# Patient Record
Sex: MALE | Race: WHITE | Employment: UNEMPLOYED | URBAN - METROPOLITAN AREA
[De-identification: names, ages, dates, MRNs, and addresses within clinical notes are randomized per-mention and may not be internally consistent; named-entity substitution may affect disease eponyms.]

---

## 2017-02-17 ENCOUNTER — OP HISTORICAL/CONVERTED ENCOUNTER (OUTPATIENT)
Dept: OTHER | Age: 42
End: 2017-02-17

## 2018-04-27 ENCOUNTER — APPOINTMENT (OUTPATIENT)
Dept: GENERAL RADIOLOGY | Age: 43
End: 2018-04-27
Attending: EMERGENCY MEDICINE
Payer: MEDICAID

## 2018-04-27 ENCOUNTER — HOSPITAL ENCOUNTER (EMERGENCY)
Age: 43
Discharge: HOME | End: 2018-04-27
Attending: EMERGENCY MEDICINE
Payer: MEDICAID

## 2018-04-27 VITALS
WEIGHT: 250 LBS | HEART RATE: 93 BPM | HEIGHT: 72 IN | OXYGEN SATURATION: 98 % | SYSTOLIC BLOOD PRESSURE: 146 MMHG | TEMPERATURE: 97.8 F | RESPIRATION RATE: 18 BRPM | DIASTOLIC BLOOD PRESSURE: 111 MMHG | BODY MASS INDEX: 33.86 KG/M2

## 2018-04-27 DIAGNOSIS — S90.32XA CONTUSION OF LEFT FOOT, INITIAL ENCOUNTER: Primary | ICD-10-CM

## 2018-04-27 PROCEDURE — 73620 X-RAY EXAM OF FOOT: CPT

## 2018-04-27 PROCEDURE — 99283 EMERGENCY DEPT VISIT LOW MDM: CPT

## 2018-04-27 RX ORDER — NAPROXEN 500 MG/1
500 TABLET ORAL
Qty: 20 TAB | Refills: 0 | Status: SHIPPED | OUTPATIENT
Start: 2018-04-27 | End: 2018-05-31

## 2018-04-27 RX ORDER — LISINOPRIL 5 MG/1
5 TABLET ORAL DAILY
COMMUNITY
End: 2021-02-26

## 2018-04-27 NOTE — DISCHARGE INSTRUCTIONS

## 2018-04-27 NOTE — ED PROVIDER NOTES
EMERGENCY DEPARTMENT HISTORY AND PHYSICAL EXAM      Date: 4/27/2018  Patient Name: Tahmina Zelaya    History of Presenting Illness     Chief Complaint   Patient presents with    Foot Pain     left outer side of foot; x 1 week       History Provided By: Patient    HPI: Tahmina Zelaya, 41 y.o. male with PMHx significant for HTN, presents ambulatory to the ED with c/o continued intermittent L lateral foot pain x 1 week after hitting his foot on his daughter's bed. Pt states his pain is worse with movement and with touch. Additionally, he states he has been unable to wear tennis shoes due to pain. He reports he has been taking Tylenol and Ibuprofen without any lasting relief. Pt notes he has been trying to elevate his foot. He denies any prior evaluation for his pain. Pt denies any prior L foot injuries. He specifically denies any recent fevers, weakness, numbness, or difficulty ambulating. There are no other complaints, changes, or physical findings at this time. PCP: None    Current Outpatient Prescriptions   Medication Sig Dispense Refill    lisinopril (PRINIVIL, ZESTRIL) 5 mg tablet Take 5 mg by mouth daily.  naproxen (NAPROSYN) 500 mg tablet Take 1 Tab by mouth every twelve (12) hours as needed for Pain. 20 Tab 0       Past History     Past Medical History:  Past Medical History:   Diagnosis Date    Asthma     Hypertension        Past Surgical History:  Past Surgical History:   Procedure Laterality Date    HX ORTHOPAEDIC Right        Family History:  History reviewed. No pertinent family history. Social History:  Social History   Substance Use Topics    Smoking status: Former Smoker    Smokeless tobacco: Never Used    Alcohol use No       Allergies: Allergies   Allergen Reactions    Tramadol Hives         Review of Systems   Review of Systems   Constitutional: Negative. Negative for chills, diaphoresis and fever. HENT: Negative.   Negative for congestion, ear pain, sore throat and trouble swallowing. Eyes: Negative. Negative for photophobia, pain, redness and visual disturbance. Respiratory: Negative. Negative for cough, chest tightness, shortness of breath and wheezing. Cardiovascular: Negative. Negative for chest pain and palpitations. Gastrointestinal: Negative. Negative for abdominal pain, blood in stool, diarrhea, nausea and vomiting. Genitourinary: Negative for dysuria and frequency. Musculoskeletal: Positive for myalgias (L foot). Negative for back pain, gait problem, joint swelling and neck pain. Skin: Negative. Neurological: Negative. Negative for seizures, syncope and headaches. Psychiatric/Behavioral: Negative. Negative for behavioral problems and confusion. The patient is not nervous/anxious. All other systems reviewed and are negative. Physical Exam   Physical Exam   Constitutional: He appears well-developed. No distress. HENT:   Head: Normocephalic and atraumatic. Eyes: EOM are normal.   Neck: Normal range of motion. Cardiovascular:   Well perfused   Pulmonary/Chest: Effort normal. No respiratory distress. Musculoskeletal:   Tenderness over anterior L 5th metatarsal PIP and DIP joint. Able to flex and extend at ankle and metatarsals. 2+ DP pulses. Good capillary refill. Neurological: He is alert. Psychiatric: He has a normal mood and affect. Nursing note and vitals reviewed. Diagnostic Study Results     Labs -   No results found for this or any previous visit (from the past 12 hour(s)). Radiologic Studies -   XR FOOT LT AP/LAT   Final Result   EXAM:  XR FOOT LT AP/LAT     INDICATION:   Trauma.     COMPARISON:  None.     FINDINGS:  Two views of the left foot demonstrate no fracture or other acute  osseous or articular abnormality. The soft tissues are within normal limits.     IMPRESSION  IMPRESSION:  No acute abnormality. Medical Decision Making   I am the first provider for this patient.     I reviewed the vital signs, available nursing notes, past medical history, past surgical history, family history and social history. Vital Signs-Reviewed the patient's vital signs. Patient Vitals for the past 12 hrs:   Temp Pulse Resp BP SpO2   04/27/18 0345 - - - - 98 %   04/27/18 0337 97.8 °F (36.6 °C) 93 18 (!) 146/111 98 %     Records Reviewed: Nursing Notes and Old Medical Records    Provider Notes (Medical Decision Making):   Patient presents with left foot pain. pain. Will get imaging to further evaluate for fracture vs. Dislocation vs. Contusion. ED Course:   Initial assessment performed. The patients presenting problems have been discussed, and they are in agreement with the care plan formulated and outlined with them. I have encouraged them to ask questions as they arise throughout their visit. Discharge Note:  4:15 AM  The patient has been re-evaluated and is ready for discharge. Reviewed available results with patient. Counseled patient on diagnosis and care plan. Patient has expressed understanding, and all questions have been answered. Patient agrees with plan and agrees to follow up as recommended, or to return to the ED if their symptoms worsen. Discharge instructions have been provided and explained to the patient, along with reasons to return to the ED. PLAN:  1. Discharge Medication List as of 4/27/2018  4:30 AM      START taking these medications    Details   naproxen (NAPROSYN) 500 mg tablet Take 1 Tab by mouth every twelve (12) hours as needed for Pain., Print, Disp-20 Tab, R-0         CONTINUE these medications which have NOT CHANGED    Details   lisinopril (PRINIVIL, ZESTRIL) 5 mg tablet Take 5 mg by mouth daily. , Historical Med           2. Follow-up Information     Follow up With Details Comments Contact Info    [facility] [place]  If symptoms worsen [address]  [phone]        Return to ED if worse     Diagnosis     Clinical Impression:   1.  Contusion of left foot, initial encounter        Attestations: This note is prepared by Hailey Hidalgo, acting as Scribe for Neisha Mann M.D. The scribe's documentation has been prepared under my direction and personally reviewed by me in its entirety. I confirm that the note above accurately reflects all work, treatment, procedures, and medical decision making performed by me.   Neisha Mann M.D

## 2018-04-27 NOTE — ED NOTES
Pt arrived to ED via ambulatory with c/o left lateral foot pain. Pt. States was running and hit daughters bed x 1 week ago. Pt is alert and orientated X 4; skin is intact; lungs are clear; pt breathes well on room air; Pt is in no acute distress. Will continue to monitor. See nursing assessment. Safety precautions in place; call light within reach. Emergency Department Nursing Plan of Care       The Nursing Plan of Care is developed from the Nursing assessment and Emergency Department Attending provider initial evaluation. The plan of care may be reviewed in the ED Provider note.     The Plan of Care was developed with the following considerations:   Patient / Family readiness to learn indicated by:verbalized understanding  Persons(s) to be included in education: patient  Barriers to Learning/Limitations:No    Signed     Samantha Gutierrez RN    4/27/2018   3:37 AM

## 2018-04-27 NOTE — ED NOTES
Assumed patient care for task only. Patient given copy of dc instructions and 1 paper script(s) and 0 electronic scripts. Patient verbalized understanding of instructions and script (s). Patient given a current medication reconciliation form and verbalized understanding of their medications. Patient verbalized understanding of the importance of discussing medications with  his or her physician or clinic they will be following up with. Patient alert and oriented and in no acute distress. Patient offered wheelchair from treatment area to hospital entrance, patient declined wheelchair.

## 2018-05-31 ENCOUNTER — HOSPITAL ENCOUNTER (EMERGENCY)
Age: 43
Discharge: HOME | End: 2018-05-31
Attending: EMERGENCY MEDICINE
Payer: MEDICAID

## 2018-05-31 VITALS
BODY MASS INDEX: 33.05 KG/M2 | SYSTOLIC BLOOD PRESSURE: 117 MMHG | RESPIRATION RATE: 18 BRPM | WEIGHT: 244 LBS | OXYGEN SATURATION: 96 % | HEART RATE: 101 BPM | DIASTOLIC BLOOD PRESSURE: 80 MMHG | TEMPERATURE: 98 F | HEIGHT: 72 IN

## 2018-05-31 DIAGNOSIS — B34.9 VIRAL SYNDROME: ICD-10-CM

## 2018-05-31 DIAGNOSIS — J00 NASOPHARYNGITIS: Primary | ICD-10-CM

## 2018-05-31 DIAGNOSIS — R05.9 COUGH: ICD-10-CM

## 2018-05-31 PROCEDURE — 99281 EMR DPT VST MAYX REQ PHY/QHP: CPT

## 2018-05-31 RX ORDER — BENZONATATE 100 MG/1
100 CAPSULE ORAL
Qty: 30 CAP | Refills: 0 | Status: SHIPPED | OUTPATIENT
Start: 2018-05-31 | End: 2018-06-07

## 2018-05-31 RX ORDER — CODEINE PHOSPHATE AND GUAIFENESIN 10; 100 MG/5ML; MG/5ML
5 SOLUTION ORAL
Qty: 120 ML | Refills: 0 | Status: SHIPPED | OUTPATIENT
Start: 2018-05-31 | End: 2018-05-31

## 2018-05-31 RX ORDER — ALBUTEROL SULFATE 90 UG/1
1-2 AEROSOL, METERED RESPIRATORY (INHALATION)
Qty: 1 INHALER | Refills: 1 | Status: SHIPPED | OUTPATIENT
Start: 2018-05-31

## 2018-05-31 RX ORDER — CODEINE PHOSPHATE AND GUAIFENESIN 10; 100 MG/5ML; MG/5ML
5 SOLUTION ORAL
Qty: 120 ML | Refills: 0 | Status: SHIPPED | OUTPATIENT
Start: 2018-05-31 | End: 2018-07-24

## 2018-05-31 RX ORDER — METHYLPREDNISOLONE 4 MG/1
TABLET ORAL
Qty: 1 DOSE PACK | Refills: 0 | Status: SHIPPED | OUTPATIENT
Start: 2018-05-31 | End: 2018-07-24

## 2018-05-31 NOTE — ED PROVIDER NOTES
EMERGENCY DEPARTMENT HISTORY AND PHYSICAL EXAM    Date: 5/31/2018  Patient Name: Jimmy Aden    History of Presenting Illness     Chief Complaint   Patient presents with    Cough     4-5 days         History Provided By: Patient        HPI: Jimmy Aden is a 41 y.o. male with a PMH of hypertension and asthma (seasonal) who presents with cough, congestion for 5 days. Pt states he was taking otc cough medication and felt better but his cough was still persistent. Pt's wife has similar symptoms at home. Pt denies any fever, nausea, vomiting, or chest pain. States he feels like he is wheezing at night and uses his daughters nebulizer which relives his symptoms. Pt is diagnosed with asthma but does not have an inhaler. PCP: None    Current Outpatient Prescriptions   Medication Sig Dispense Refill    methylPREDNISolone (MEDROL, JEOVANY,) 4 mg tablet Take as directed 1 Dose Pack 0    benzonatate (TESSALON PERLES) 100 mg capsule Take 1 Cap by mouth three (3) times daily as needed for Cough for up to 7 days. 30 Cap 0    albuterol (PROVENTIL HFA, VENTOLIN HFA, PROAIR HFA) 90 mcg/actuation inhaler Take 1-2 Puffs by inhalation every four (4) hours as needed for Wheezing. 1 Inhaler 1    guaiFENesin-codeine (ROBITUSSIN AC) 100-10 mg/5 mL solution Take 5 mL by mouth nightly. Max Daily Amount: 5 mL. 120 mL 0    lisinopril (PRINIVIL, ZESTRIL) 5 mg tablet Take 5 mg by mouth daily. Past History     Past Medical History:  Past Medical History:   Diagnosis Date    Asthma     Hypertension        Past Surgical History:  Past Surgical History:   Procedure Laterality Date    HX ORTHOPAEDIC Right        Family History:  History reviewed. No pertinent family history. Social History:  Social History   Substance Use Topics    Smoking status: Former Smoker    Smokeless tobacco: Never Used    Alcohol use No       Allergies:   Allergies   Allergen Reactions    Tomato Swelling    Tramadol Hives         Review of Systems   Review of Systems   Constitutional: Positive for fatigue and fever. Negative for activity change, appetite change and chills. HENT: Positive for congestion. Negative for ear discharge, ear pain, facial swelling and mouth sores. Eyes: Negative for discharge. Respiratory: Positive for cough and wheezing. Negative for choking, chest tightness, shortness of breath and stridor. Neurological: Positive for headaches. Negative for light-headedness. All other systems reviewed and are negative. Physical Exam     Vitals:    05/31/18 1141   BP: 117/80   Pulse: (!) 101   Resp: 18   Temp: 98 °F (36.7 °C)   SpO2: 96%   Weight: 110.7 kg (244 lb)   Height: 6' (1.829 m)     Physical Exam   Constitutional: He appears well-developed. HENT:   Head: Head is without raccoon's eyes, without Green's sign and without contusion. Right Ear: No tenderness. Tympanic membrane is not bulging. No middle ear effusion (clear effusion). Left Ear: No tenderness. Tympanic membrane is not bulging. No middle ear effusion (clear effusion). Nose: Mucosal edema and rhinorrhea present. No sinus tenderness or nasal deformity. Mouth/Throat: No dental abscesses or dental caries. Posterior oropharyngeal erythema present. No oropharyngeal exudate, posterior oropharyngeal edema or tonsillar abscesses. Eyes: Right conjunctiva is injected. Left conjunctiva is injected. Cardiovascular: Regular rhythm. Pulmonary/Chest: Breath sounds normal. He has no decreased breath sounds. He has no wheezes. He has no rhonchi. He has no rales. Lymphadenopathy:        Head (right side): No tonsillar adenopathy present. Head (left side): No tonsillar adenopathy present. Skin: Skin is warm. Diagnostic Study Results     Labs -   No results found for this or any previous visit (from the past 12 hour(s)).     Radiologic Studies -   No orders to display     CT Results  (Last 48 hours)    None        CXR Results  (Last 48 hours) None            Medical Decision Making   I am the first provider for this patient. I reviewed the vital signs, available nursing notes, past medical history, past surgical history, family history and social history. Vital Signs-Reviewed the patient's vital signs. Disposition:  discharged    DISCHARGE NOTE:   12:35      Care plan outlined and precautions discussed. Patient has no new complaints, changes, or physical findings. All medications were reviewed with the patient; will d/c home with Medrol Dose Pack, Robitussin AC, Albuterol and Tessalon Pearls. All of pt's questions and concerns were addressed. Patient was instructed and agrees to follow up with [facility], as well as to return to the ED upon further deterioration. Patient is ready to go home. Follow-up Information     Follow up With Details Comments Contact Info    [facility] In 1 week If symptoms worsen [address]  [phone]          Discharge Medication List as of 5/31/2018 12:29 PM      START taking these medications    Details   methylPREDNISolone (MEDROL, JEOVANY,) 4 mg tablet Take as directed, Normal, Disp-1 Dose Pack, R-0      benzonatate (TESSALON PERLES) 100 mg capsule Take 1 Cap by mouth three (3) times daily as needed for Cough for up to 7 days. , Normal, Disp-30 Cap, R-0      albuterol (PROVENTIL HFA, VENTOLIN HFA, PROAIR HFA) 90 mcg/actuation inhaler Take 1-2 Puffs by inhalation every four (4) hours as needed for Wheezing., Normal, Disp-1 Inhaler, R-1      guaiFENesin-codeine (ROBITUSSIN AC) 100-10 mg/5 mL solution Take 5 mL by mouth nightly. Max Daily Amount: 5 mL. , Print, Disp-120 mL, R-0         CONTINUE these medications which have NOT CHANGED    Details   lisinopril (PRINIVIL, ZESTRIL) 5 mg tablet Take 5 mg by mouth daily. , Historical Med             Provider Notes (Medical Decision Making):   URI, Bronchitis, Asthma Exacerbation    Procedures        Diagnosis     Clinical Impression:   1. Nasopharyngitis    2. Cough    3.  Viral syndrome

## 2018-05-31 NOTE — ED NOTES
Patient here with c/o congested cough. Patient states problem with cough for 4-5 days. States he thought it was getting better but then didn't. Patient reports that his wife been coughing too. Patient denies fevers. Emergency Department Nursing Plan of Care       The Nursing Plan of Care is developed from the Nursing assessment and Emergency Department Attending provider initial evaluation. The plan of care may be reviewed in the ED Provider note.     The Plan of Care was developed with the following considerations:   Patient / Family readiness to learn indicated by:verbalized understanding  Persons(s) to be included in education: patient  Barriers to Learning/Limitations:No    Signed     Quinton Sanchez RN    5/31/2018   12:36 PM

## 2018-05-31 NOTE — DISCHARGE INSTRUCTIONS
Cough: Care Instructions  Your Care Instructions    A cough is your body's response to something that bothers your throat or airways. Many things can cause a cough. You might cough because of a cold or the flu, bronchitis, or asthma. Smoking, postnasal drip, allergies, and stomach acid that backs up into your throat also can cause coughs. A cough is a symptom, not a disease. Most coughs stop when the cause, such as a cold, goes away. You can take a few steps at home to cough less and feel better. Follow-up care is a key part of your treatment and safety. Be sure to make and go to all appointments, and call your doctor if you are having problems. It's also a good idea to know your test results and keep a list of the medicines you take. How can you care for yourself at home? · Drink lots of water and other fluids. This helps thin the mucus and soothes a dry or sore throat. Honey or lemon juice in hot water or tea may ease a dry cough. · Take cough medicine as directed by your doctor. · Prop up your head on pillows to help you breathe and ease a dry cough. · Try cough drops to soothe a dry or sore throat. Cough drops don't stop a cough. Medicine-flavored cough drops are no better than candy-flavored drops or hard candy. · Do not smoke. Avoid secondhand smoke. If you need help quitting, talk to your doctor about stop-smoking programs and medicines. These can increase your chances of quitting for good. When should you call for help? Call 911 anytime you think you may need emergency care. For example, call if:  ? · You have severe trouble breathing. ?Call your doctor now or seek immediate medical care if:  ? · You cough up blood. ? · You have new or worse trouble breathing. ? · You have a new or higher fever. ? · You have a new rash. ? Watch closely for changes in your health, and be sure to contact your doctor if:  ? · You cough more deeply or more often, especially if you notice more mucus or a change in the color of your mucus. ? · You have new symptoms, such as a sore throat, an earache, or sinus pain. ? · You do not get better as expected. Where can you learn more? Go to http://samir-selvin.info/. Enter D279 in the search box to learn more about \"Cough: Care Instructions. \"  Current as of: May 12, 2017  Content Version: 11.4  © 2006-2017 [employer]. Care instructions adapted under license by [employer] (which disclaims liability or warranty for this information). If you have questions about a medical condition or this instruction, always ask your healthcare professional. [place] [address] any warranty or liability for your use of this information. Upper Respiratory Infection (Cold): Care Instructions  Your Care Instructions    An upper respiratory infection, or URI, is an infection of the nose, sinuses, or throat. URIs are spread by coughs, sneezes, and direct contact. The common cold is the most frequent kind of URI. The flu and sinus infections are other kinds of URIs. Almost all URIs are caused by viruses. Antibiotics won't cure them. But you can treat most infections with home care. This may include drinking lots of fluids and taking over-the-counter pain medicine. You will probably feel better in 4 to 10 days. The doctor has checked you carefully, but problems can develop later. If you notice any problems or new symptoms, get medical treatment right away. Follow-up care is a key part of your treatment and safety. Be sure to make and go to all appointments, and call your doctor if you are having problems. It's also a good idea to know your test results and keep a list of the medicines you take. How can you care for yourself at home? · To prevent dehydration, drink plenty of fluids, enough so that your urine is light yellow or clear like water.  Choose water and other caffeine-free clear liquids until you feel better. If you have kidney, heart, or liver disease and have to limit fluids, talk with your doctor before you increase the amount of fluids you drink. · Take an over-the-counter pain medicine, such as acetaminophen (Tylenol), ibuprofen (Advil, Motrin), or naproxen (Aleve). Read and follow all instructions on the label. · Before you use cough and cold medicines, check the label. These medicines may not be safe for young children or for people with certain health problems. · Be careful when taking over-the-counter cold or flu medicines and Tylenol at the same time. Many of these medicines have acetaminophen, which is Tylenol. Read the labels to make sure that you are not taking more than the recommended dose. Too much acetaminophen (Tylenol) can be harmful. · Get plenty of rest.  · Do not smoke or allow others to smoke around you. If you need help quitting, talk to your doctor about stop-smoking programs and medicines. These can increase your chances of quitting for good. When should you call for help? Call 911 anytime you think you may need emergency care. For example, call if:  ? · You have severe trouble breathing. ?Call your doctor now or seek immediate medical care if:  ? · You seem to be getting much sicker. ? · You have new or worse trouble breathing. ? · You have a new or higher fever. ? · You have a new rash. ? Watch closely for changes in your health, and be sure to contact your doctor if:  ? · You have a new symptom, such as a sore throat, an earache, or sinus pain. ? · You cough more deeply or more often, especially if you notice more mucus or a change in the color of your mucus. ? · You do not get better as expected. Where can you learn more? Go to http://samir-selvin.info/. Enter W107 in the search box to learn more about \"Upper Respiratory Infection (Cold): Care Instructions. \"  Current as of:  May 12, 2017  Content Version: 11.4  © 3234-0026 [employer]. Care instructions adapted under license by [employer] (which disclaims liability or warranty for this information). If you have questions about a medical condition or this instruction, always ask your healthcare professional. [address] any warranty or liability for your use of this information.

## 2018-07-24 ENCOUNTER — APPOINTMENT (OUTPATIENT)
Dept: GENERAL RADIOLOGY | Age: 43
End: 2018-07-24
Payer: MEDICAID

## 2018-07-24 ENCOUNTER — HOSPITAL ENCOUNTER (EMERGENCY)
Age: 43
Discharge: HOME | End: 2018-07-24
Attending: EMERGENCY MEDICINE
Payer: MEDICAID

## 2018-07-24 VITALS
HEIGHT: 72 IN | BODY MASS INDEX: 33.86 KG/M2 | DIASTOLIC BLOOD PRESSURE: 98 MMHG | RESPIRATION RATE: 18 BRPM | HEART RATE: 110 BPM | OXYGEN SATURATION: 99 % | WEIGHT: 250 LBS | SYSTOLIC BLOOD PRESSURE: 157 MMHG | TEMPERATURE: 98.6 F

## 2018-07-24 DIAGNOSIS — J06.9 ACUTE UPPER RESPIRATORY INFECTION: Primary | ICD-10-CM

## 2018-07-24 DIAGNOSIS — R06.2 WHEEZING: ICD-10-CM

## 2018-07-24 PROCEDURE — 77030029684 HC NEB SM VOL KT MONA -A

## 2018-07-24 PROCEDURE — 94640 AIRWAY INHALATION TREATMENT: CPT

## 2018-07-24 PROCEDURE — 74011000250 HC RX REV CODE- 250: Performed by: PHYSICIAN ASSISTANT

## 2018-07-24 PROCEDURE — 71046 X-RAY EXAM CHEST 2 VIEWS: CPT

## 2018-07-24 PROCEDURE — 99282 EMERGENCY DEPT VISIT SF MDM: CPT

## 2018-07-24 RX ORDER — CODEINE PHOSPHATE AND GUAIFENESIN 10; 100 MG/5ML; MG/5ML
5 SOLUTION ORAL
Qty: 115 ML | Refills: 0 | Status: SHIPPED | OUTPATIENT
Start: 2018-07-24 | End: 2021-02-26

## 2018-07-24 RX ORDER — PREDNISONE 20 MG/1
20 TABLET ORAL DAILY
Qty: 10 TAB | Refills: 0 | Status: SHIPPED | OUTPATIENT
Start: 2018-07-24 | End: 2018-08-03

## 2018-07-24 RX ORDER — AZITHROMYCIN 250 MG/1
TABLET, FILM COATED ORAL
Qty: 6 TAB | Refills: 0 | Status: SHIPPED | OUTPATIENT
Start: 2018-07-24 | End: 2018-07-29

## 2018-07-24 RX ORDER — ALBUTEROL SULFATE 0.83 MG/ML
5 SOLUTION RESPIRATORY (INHALATION)
Status: COMPLETED | OUTPATIENT
Start: 2018-07-24 | End: 2018-07-24

## 2018-07-24 RX ORDER — ALBUTEROL SULFATE 90 UG/1
2 AEROSOL, METERED RESPIRATORY (INHALATION)
Qty: 1 INHALER | Refills: 0 | Status: SHIPPED | OUTPATIENT
Start: 2018-07-24

## 2018-07-24 RX ORDER — IPRATROPIUM BROMIDE AND ALBUTEROL SULFATE 2.5; .5 MG/3ML; MG/3ML
3 SOLUTION RESPIRATORY (INHALATION)
Status: COMPLETED | OUTPATIENT
Start: 2018-07-24 | End: 2018-07-24

## 2018-07-24 RX ADMIN — IPRATROPIUM BROMIDE AND ALBUTEROL SULFATE 3 ML: .5; 3 SOLUTION RESPIRATORY (INHALATION) at 20:43

## 2018-07-24 RX ADMIN — ALBUTEROL SULFATE 5 MG: 2.5 SOLUTION RESPIRATORY (INHALATION) at 21:02

## 2018-07-25 NOTE — ED PROVIDER NOTES
EMERGENCY DEPARTMENT HISTORY AND PHYSICAL EXAM 
 
 
Date: 7/24/2018 Patient Name: Sheri Bernstein 
 
History of Presenting Illness Chief Complaint Patient presents with  Cough  
  x 3 weeks, History Provided By: Patient HPI: Sheri Bernstein, 41 y.o. male with PMHx significant for htn and asthma, presents ambulatory to the ED with cc of persistent, productive coughing since 3 weeks ago alongside associated mild sob, and chest congestion. Pt states that he has been coughing up mucous that is clear and then turns yellowish in coloration. Pt reports that his sob his exacerbated with ambulation. Pt notes that he does not have a hx of bronchitis or pna. Pt states that he has been rx'd Albuterol. Pt denies being treated to asthma before. Pt states that he has htn and takes his medications daily for the htn. Pt also reports that he had a fever and bilateral ear pain until recently, and states that his fever at one point felt like the flu before it subsided. Pt currently presents to the ED with a body temperature of 98.6 F. Pt notes that he is allergic to Tramadol. Pt is a former tobacco smoker and does not drink alcohol. Chief Complaint: productive cough Duration: 3 weeks ago Timing:  Constant Location: n/a Quality: n/a Severity: N/A Modifying Factors: n/a Associated Symptoms: sob There are no other complaints, changes, or physical findings at this time. PCP: None Current Outpatient Prescriptions Medication Sig Dispense Refill  albuterol (PROVENTIL HFA, VENTOLIN HFA, PROAIR HFA) 90 mcg/actuation inhaler Take 2 Puffs by inhalation every four (4) hours as needed for Wheezing or Shortness of Breath. 1 Inhaler 0  predniSONE (DELTASONE) 20 mg tablet Take 1 Tab by mouth daily for 10 days.  With Breakfast 10 Tab 0  
 azithromycin (ZITHROMAX Z-JEOVANY) 250 mg tablet Take 2 tabs on day 1, then 1 tab daily for total of 5 days 6 Tab 0  
 albuterol (PROVENTIL HFA, VENTOLIN HFA, PROAIR HFA) 90 mcg/actuation inhaler Take 1-2 Puffs by inhalation every four (4) hours as needed for Wheezing. 1 Inhaler 1  
 lisinopril (PRINIVIL, ZESTRIL) 5 mg tablet Take 5 mg by mouth daily. Past History Past Medical History: 
Past Medical History:  
Diagnosis Date  Asthma  Hypertension Past Surgical History: 
Past Surgical History:  
Procedure Laterality Date  HX ORTHOPAEDIC Right Family History: 
History reviewed. No pertinent family history. Social History: 
Social History Substance Use Topics  Smoking status: Former Smoker  Smokeless tobacco: Never Used  Alcohol use No  
 
 
Allergies: Allergies Allergen Reactions  Tomato Swelling  Tramadol Hives Review of Systems Review of Systems Constitutional: Positive for fever. Negative for chills and fatigue. HENT: Positive for congestion (chest). Negative for sore throat. Eyes: Negative. Respiratory: Positive for cough (productive cough) and shortness of breath. Cardiovascular: Negative for chest pain and palpitations. Gastrointestinal: Negative for abdominal pain, nausea and vomiting. Genitourinary: Negative for dysuria. Musculoskeletal: Negative. Skin: Negative. Neurological: Negative for dizziness, weakness, light-headedness and headaches. Psychiatric/Behavioral: Negative. All other systems reviewed and are negative. Physical Exam  
Physical Exam  
Constitutional: He is oriented to person, place, and time. He appears well-developed and well-nourished. No distress. HENT:  
Head: Normocephalic and atraumatic. Right Ear: Tympanic membrane, external ear and ear canal normal.  
Left Ear: Tympanic membrane, external ear and ear canal normal.  
Nose: Mucosal edema present. Mouth/Throat: Uvula is midline and mucous membranes are normal. Posterior oropharyngeal erythema present. No oropharyngeal exudate, posterior oropharyngeal edema or tonsillar abscesses.   
Eyes: Conjunctivae are normal. No scleral icterus. Neck: Neck supple. No JVD present. No tracheal deviation present. Cardiovascular: Normal rate, regular rhythm and normal heart sounds. No murmur heard. Pulmonary/Chest: Effort normal. No respiratory distress. He has decreased breath sounds. He has wheezes. He has no rhonchi. He has no rales. He exhibits no tenderness. Speaks in full sentences Abdominal: Soft. There is no tenderness. Musculoskeletal: Normal range of motion. Lymphadenopathy:  
  He has no cervical adenopathy. Negative anterior cervical lymphadenopathy Neurological: He is alert and oriented to person, place, and time. He has normal strength. Gait normal. GCS eye subscore is 4. GCS verbal subscore is 5. GCS motor subscore is 6. Skin: Skin is warm and dry. He is not diaphoretic. Psychiatric: He has a normal mood and affect. Nursing note and vitals reviewed. Diagnostic Study Results Labs - No results found for this or any previous visit (from the past 12 hour(s)). Radiologic Studies -  
XR CHEST PA LAT Final Result CT Results  (Last 48 hours) None CXR Results  (Last 48 hours) 07/24/18 2038  XR CHEST PA LAT Final result Impression:  IMPRESSION: No acute infiltrate. Narrative:  Clinical indication: Cough congestion. Frontal and lateral views of the chest obtained, no prior. There is elevation of  
the right hemidiaphragm. There is no acute infiltrate the heart size is normal.  
   
  
  
 
 
 
Medical Decision Making I am the first provider for this patient. I reviewed the vital signs, available nursing notes, past medical history, past surgical history, family history and social history. Vital Signs-Reviewed the patient's vital signs. Patient Vitals for the past 12 hrs: 
 Temp Pulse Resp BP SpO2  
07/24/18 2110 - - - - 99 % 07/24/18 2044 - - - - 100 % 07/24/18 2014 98.6 °F (37 °C) 74 18 (!) 152/104 100 % Records Reviewed: Nursing Notes and Old Medical Records Provider Notes (Medical Decision Making):  
8:41 PM 
40 y/o male c/o cough, congestion and mild SOB. States he thought he had the flu a few weeks ago; symptoms subsided however he still has a persistent cough; has been taking OTC cough meds with minimal relief. Denied any tobacco use; no hx asthma. Moderate wheezing noted on exam.  Will plan on cxr and nebulizers and will reassess. Lolita Mayer PA-C  
 
9:38 PM 
Wheezing noted to improve after multiple nebs. cxr negative. Discussed results with pt. Will plan on albuterol, prednisone, and pcp follow up. All questions answered and patient in agreement with plan of care. Will plan for discharge. Lolita Mayer PA-C 
 
 
 
ED Course:  
Initial assessment performed. The patients presenting problems have been discussed, and they are in agreement with the care plan formulated and outlined with them. I have encouraged them to ask questions as they arise throughout their visit. Critical Care Time: 0 minutes Disposition: 
DISCHARGE NOTE The patient has been re-evaluated and is ready for discharge. Reviewed available results with patient. Counseled pt on diagnosis and care plan. Pt has expressed understanding, and all questions have been answered. Pt agrees with plan and agrees to F/U as recommended, or return to the ED if their sxs worsen. Discharge instructions have been provided and explained to the pt, along with reasons to return to the ED. Written by Osbaldo Lambert ED Scribe, as dictated by Hari Mendes PA-C. PLAN: 
1. Current Discharge Medication List  
  
START taking these medications Details  
!! albuterol (PROVENTIL HFA, VENTOLIN HFA, PROAIR HFA) 90 mcg/actuation inhaler Take 2 Puffs by inhalation every four (4) hours as needed for Wheezing or Shortness of Breath. Qty: 1 Inhaler, Refills: 0  
  
predniSONE (DELTASONE) 20 mg tablet Take 1 Tab by mouth daily for 10 days.  With Breakfast 
Qty: 10 Tab, Refills: 0  
  
azithromycin (ZITHROMAX Z-JEOVANY) 250 mg tablet Take 2 tabs on day 1, then 1 tab daily for total of 5 days Qty: 6 Tab, Refills: 0  
  
 !! - Potential duplicate medications found. Please discuss with provider. CONTINUE these medications which have NOT CHANGED Details  
!! albuterol (PROVENTIL HFA, VENTOLIN HFA, PROAIR HFA) 90 mcg/actuation inhaler Take 1-2 Puffs by inhalation every four (4) hours as needed for Wheezing. Qty: 1 Inhaler, Refills: 1  
  
lisinopril (PRINIVIL, ZESTRIL) 5 mg tablet Take 5 mg by mouth daily. !! - Potential duplicate medications found. Please discuss with provider. 2.  
Follow-up Information Follow up With Details Comments Contact Info [facility] EMERGENCY DEPT  If symptoms worsen [address] [facility] Call in 1 day ER follow up and establish primary care [address] [place] [address] 
[phone] Return to ED if worse Diagnosis Clinical Impression: 1. Acute upper respiratory infection 2. Wheezing Attestation: This note is prepared by Eduard Anthony, acting as Scribe for ROSIBEL Davis PA-C: The scribe's documentation has been prepared under my direction and personally reviewed by me in its entirety. I confirm that the note above accurately reflects all work, treatment, procedures, and medical decision making performed by me.

## 2018-07-25 NOTE — ED NOTES
Patient given copy of dc instructions and four script(s). Patient verbalized understanding of instructions and script(s). Patient given a current medication reconciliation form and verbalized understanding of their medications. Patient verbalized understanding of the importance of discussing medications with  his or her physician or clinic when they follow up. Patient alert and oriented and in no acute distress. Pt verbalizes pain scale of 4 out of 10. Patient discharged home without assistance. Wheelchair was declined.

## 2018-07-25 NOTE — DISCHARGE INSTRUCTIONS

## 2018-07-25 NOTE — ED NOTES
Pt C/O cough X 2-3 weeks. Has become productive over last few days. White in color. Rhonchi in all lung field. Pt is warm and dry. RA SpO2 96% at this time. Pt is afebrile. Emergency Department Nursing Plan of Care       The Nursing Plan of Care is developed from the Nursing assessment and Emergency Department Attending provider initial evaluation. The plan of care may be reviewed in the ED Provider note.     The Plan of Care was developed with the following considerations:   Patient / Family readiness to learn indicated by:verbalized understanding  Persons(s) to be included in education: patient  Barriers to Learning/Limitations:No    Signed     William Parkinson RN    7/24/2018   8:29 PM

## 2018-09-07 ENCOUNTER — ED HISTORICAL/CONVERTED ENCOUNTER (OUTPATIENT)
Dept: OTHER | Age: 43
End: 2018-09-07

## 2021-02-25 ENCOUNTER — TELEPHONE (OUTPATIENT)
Dept: FAMILY MEDICINE CLINIC | Age: 46
End: 2021-02-25

## 2021-02-25 NOTE — TELEPHONE ENCOUNTER
Called to check in for appt and to adv appt would have to be virtual or rescheduled due to appt being for a new patient. Message adv that the number dialed is not a working number.

## 2021-02-26 ENCOUNTER — VIRTUAL VISIT (OUTPATIENT)
Dept: FAMILY MEDICINE CLINIC | Age: 46
End: 2021-02-26
Payer: COMMERCIAL

## 2021-02-26 DIAGNOSIS — I10 ESSENTIAL HYPERTENSION: ICD-10-CM

## 2021-02-26 DIAGNOSIS — R07.9 CHEST PAIN, UNSPECIFIED TYPE: Primary | ICD-10-CM

## 2021-02-26 DIAGNOSIS — J45.40 MODERATE PERSISTENT ASTHMA, UNSPECIFIED WHETHER COMPLICATED: ICD-10-CM

## 2021-02-26 DIAGNOSIS — F90.9 ATTENTION DEFICIT HYPERACTIVITY DISORDER (ADHD), UNSPECIFIED ADHD TYPE: ICD-10-CM

## 2021-02-26 PROCEDURE — 99204 OFFICE O/P NEW MOD 45 MIN: CPT | Performed by: STUDENT IN AN ORGANIZED HEALTH CARE EDUCATION/TRAINING PROGRAM

## 2021-02-26 RX ORDER — DEXTROAMPHETAMINE SACCHARATE, AMPHETAMINE ASPARTATE, DEXTROAMPHETAMINE SULFATE AND AMPHETAMINE SULFATE 7.5; 7.5; 7.5; 7.5 MG/1; MG/1; MG/1; MG/1
30 TABLET ORAL 2 TIMES DAILY
COMMUNITY

## 2021-02-26 NOTE — PROGRESS NOTES
Troy Cardoso  42 y.o. male  1975  [address] [place] [address] Road  [id number]   [address]:    Telemedicine Progress Note  Aaron Matson MD       Encounter Date and Time: February 26, 2021 at 2:33 PM    Consent: Troy Cardoso, who was seen by synchronous (real-time) audio-video technology, and/or his healthcare decision maker, is aware that this patient-initiated, Telehealth encounter on 2/26/2021 is a billable service, with coverage as determined by his insurance carrier. He is aware that he may receive a bill and has provided verbal consent to proceed: Yes. Chief Complaint   Patient presents with    Establish Care     History of Present Illness   Troy Cardoso is a 42 y.o. male was evaluated by synchronous (real-time) audio-video technology from home, through a secure patient portal.    Patient has a history of HTN, ESTEVAN, and asthma. Presents to [facility] care. HTN: Stopped Lisinopril a few years ago after losing weight. BP at home 120/70s. ADHD: Diagnosed in 1988 and evaluated by Dr. James Cornejo. Mainly difficulty concentrating. Remodels trailers and houses. Taking Adderall 30mg BID. Asthma: Diagnosed several years ago; feels worse in the last couple years. Has daily cough, wheezing, and difficulty breathing. Improves for a few weeks then comes back. Feels like his normal asthma symptoms. Uses Albuterol inhaler 2-3x/day and nebulizer 3x/week. Triggers include heat/cold. Has never been on controller medication. Denies COVID exposure, fevers, chills. Has left chest pain that started one month ago. Describes as intermittent sharp pain. Occurs every 3-4 days. Feels like indigestion pain. Denies associated SOB. Worse with exertion and better with rest. Feels legs have been more swollen for the past week. Denies hx of heart attack. Denies chest pain currently. Mother had MI and sister had MI in 45s. Immunizations - reviewed:      There is no immunization history on file for this patient. Flu: Has not received  Tdap: 3 years ago      Health Maintenance reviewed -   Colonoscopy-Not indicated   DEXA scan-Not indicated  PSA testing N/A  HIV testing-Due  Hepatitis C testing-Due  AAA screening-Not indicated   Lung cancer screening-Not indicated     Smoked 1ppd x15 years. Quit 10 years ago. Denies ETOH/illicits. Review of Systems   Review of Systems   Constitutional: Negative for chills and fever. HENT: Negative for congestion and sore throat. Eyes: Negative for blurred vision and double vision. Respiratory: Negative for cough and shortness of breath. Cardiovascular: Positive for leg swelling. Negative for palpitations. Gastrointestinal: Negative for nausea and vomiting. Genitourinary: Negative for dysuria and urgency. Musculoskeletal: Negative for myalgias and neck pain. Neurological: Negative for focal weakness and headaches. Vitals/Objective:     General: alert, cooperative, no distress; appears obese   Mental  status: mental status: alert, oriented to person, place, and time, normal mood, behavior, speech, dress, motor activity, and thought processes   Resp: resp: normal effort and no respiratory distress   Neuro: neuro: no gross deficits   Skin: skin: no discoloration or lesions of concern on visible areas   Due to this being a TeleHealth evaluation, many elements of the physical examination are unable to be assessed. Assessment and Plan:     Assessment/Plan:    Estalbish care:  -Return for complete physical    Chest pain: Anginal symptoms. Higher risk based on obesity, HTN, and family history. In office visit arranged 3/1 at 1pm for EKG and labs. Will arrange Cardiology follow up at that time as well. ED precautions discussed.     Moderate persistent asthma: Uncontrolled with Albuterol as needed.   -Continue Albuterol inhaler/nebulizer Q4H PRN  -Start Pulmicort daily    ADHD: Reported history of ADHD; has been prescribed Adderall 30mg BID by prior PCP.  reviewed. -Neuropsych eval    HTN: Not on meds currently. Will check BP during in-person visit. Time spent in direct conversation with the patient to include medical condition(s) discussed, assessment and treatment plan:       We discussed the expected course, resolution and complications of the diagnosis(es) in detail. Medication risks, benefits, costs, interactions, and alternatives were discussed as indicated. I advised him to contact the office if his condition worsens, changes or fails to improve as anticipated. He expressed understanding with the diagnosis(es) and plan. Patient understands that this encounter was a temporary measure, and the importance of further follow up and examination was emphasized. Patient verbalized understanding. Patient informed to follow up: 3/1 at 1pm.    Electronically Signed: Vega Huang MD, [address] [place] [address]    CPT Codes 77423-35450 for Established Patients may apply to this Telehealth Visit. POS code: 18. Modifier GT    Guero Villa is a 42 y.o. male who was evaluated by an audio-video encounter for concerns as above. Patient identification was verified prior to start of the visit. A caregiver was present when appropriate. Due to this being a TeleHealth encounter (During [facility] emergency), evaluation of the following organ systems was limited: Vitals/Constitutional/EENT/Resp/CV/GI//MS/Neuro/Skin/Heme-Lymph-Imm. Pursuant to the emergency declaration under the [facility], 1135 waiver authority and the [employer] Act, this Virtual Visit was conducted, with patient's (and/or legal guardian's) consent, to reduce the patient's risk of exposure to COVID-19 and provide necessary medical care. Services were provided through a synchronous discussion virtually to substitute for in-person clinic visit.  I was at home. The patient was at home. History   Patients past medical, surgical and family histories were reviewed and updated. Past Medical History:   Diagnosis Date    Asthma     Hypertension      Past Surgical History:   Procedure Laterality Date    HX ORTHOPAEDIC Right      Family History   Problem Relation Age of Onset    Heart Disease Mother     Heart Disease Sister      Social History     Tobacco Use    Smoking status: Former Smoker     Packs/day: 1.00     Types: Cigarettes     Quit date: 2011     Years since quitting: 10.1    Smokeless tobacco: Never Used   Substance Use Topics    Alcohol use: No    Drug use: No     There is no problem list on file for this patient. Current Medications/Allergies   Medications and Allergies reviewed:    Current Outpatient Medications   Medication Sig Dispense Refill    dextroamphetamine-amphetamine (AdderalL) 30 mg tablet Take 30 mg by mouth two (2) times a day.  budesonide (PULMICORT) 180 mcg/actuation aepb inhaler Take 3 Puffs by inhalation daily. 1 Inhaler 2    albuterol (PROVENTIL HFA, VENTOLIN HFA, PROAIR HFA) 90 mcg/actuation inhaler Take 2 Puffs by inhalation every four (4) hours as needed for Wheezing or Shortness of Breath. 1 Inhaler 0    albuterol (PROVENTIL HFA, VENTOLIN HFA, PROAIR HFA) 90 mcg/actuation inhaler Take 1-2 Puffs by inhalation every four (4) hours as needed for Wheezing.  1 Inhaler 1     Allergies   Allergen Reactions    Tomato Swelling    Tramadol Hives

## 2021-03-01 ENCOUNTER — OFFICE VISIT (OUTPATIENT)
Dept: FAMILY MEDICINE CLINIC | Age: 46
End: 2021-03-01